# Patient Record
Sex: FEMALE | URBAN - METROPOLITAN AREA
[De-identification: names, ages, dates, MRNs, and addresses within clinical notes are randomized per-mention and may not be internally consistent; named-entity substitution may affect disease eponyms.]

---

## 2020-12-06 ENCOUNTER — COMMUNICATION - HEALTHEAST (OUTPATIENT)
Dept: SCHEDULING | Facility: CLINIC | Age: 60
End: 2020-12-06

## 2021-06-13 NOTE — TELEPHONE ENCOUNTER
Pt is not with [facility] or [facility] - is here visiting   Her question - where would be the nearest ED  Pt says she is having a lot of abdominal pain     Answer > Leonid Villanueva RN /FNA    Additional Information    General information question, no triage required and triager able to answer question    Protocols used: INFORMATION ONLY CALL-A-AH